# Patient Record
Sex: FEMALE | Race: WHITE | URBAN - METROPOLITAN AREA
[De-identification: names, ages, dates, MRNs, and addresses within clinical notes are randomized per-mention and may not be internally consistent; named-entity substitution may affect disease eponyms.]

---

## 2022-09-09 ENCOUNTER — TELEPHONE (OUTPATIENT)
Dept: OBGYN CLINIC | Facility: CLINIC | Age: 57
End: 2022-09-09

## 2023-03-02 NOTE — TELEPHONE ENCOUNTER
Patient (name and  verified) is calling for results from HPV test. Explained to patient that the provider is waiting for 2 more results and then will call the patient with recommendations. Verbalized understanding.

## 2023-03-09 NOTE — TELEPHONE ENCOUNTER
Daughter  (Konrad Mackey) requesting RX for pt:    Vitamin D     [facility] [address] [facility] [address], [phone], [phone]

## 2023-03-09 NOTE — TELEPHONE ENCOUNTER
Patient's daughter, Kareem Robert (name and , ANNEL verified) instructed on provider message below regarding OTC Vitamin D3 and dose/directions. Verbalized understanding. Priscila Flowers, JESSICA   2023 11:33 AM CST Back to Top      Spoke with patient/daughter, lipid elevation, reduce saturated fats, diet education provided, increase sources of Vit D3/supplement 2000 units Vit D3 once daily with food.

## 2023-03-13 PROBLEM — B97.7 HIGH RISK HPV INFECTION: Status: ACTIVE | Noted: 2023-03-13

## 2023-05-05 NOTE — TELEPHONE ENCOUNTER
Try to call patient but couldn't reach her, left a VM to make a follow up appt with gynecology for her abnormal HPV results.

## 2023-09-19 ENCOUNTER — HOSPITAL ENCOUNTER (OUTPATIENT)
Dept: MAMMOGRAPHY | Age: 58
Discharge: HOME | End: 2023-09-19
Attending: NURSE PRACTITIONER
Payer: MEDICAID

## 2023-09-19 DIAGNOSIS — Z12.31 ENCOUNTER FOR SCREENING MAMMOGRAM FOR MALIGNANT NEOPLASM OF BREAST: ICD-10-CM

## 2023-09-19 PROCEDURE — 77067 SCR MAMMO BI INCL CAD: CPT | Performed by: NURSE PRACTITIONER

## 2023-09-19 PROCEDURE — 77063 BREAST TOMOSYNTHESIS BI: CPT | Performed by: NURSE PRACTITIONER

## 2023-10-13 ENCOUNTER — TELEPHONE (OUTPATIENT)
Dept: INTERNAL MEDICINE CLINIC | Facility: CLINIC | Age: 58
End: 2023-10-13

## 2023-10-13 NOTE — TELEPHONE ENCOUNTER
Maggi, would like to inform the doctor that they phoned the patient to schedule an appointment for additional diagnostic view of the left breast. Maggi, states that the patient did not want to schedule an appointment. Maggi, is not sure if the office staff would like to reach out to the patient and speak with her about this.

## 2023-10-14 NOTE — TELEPHONE ENCOUNTER
[employer] [employer] Line: Dayne ID # [id number]. Attempted to call the patient, unable to leave message, mailbox stating \"nothing has been recorded\" and asked to re-record the message repeatedly.     RN to follow up.

## 2023-10-17 NOTE — TELEPHONE ENCOUNTER
Please send a certified letter regarding need to follow up on results/abnormal results. Thank you!

## (undated) NOTE — LETTER
10/16/2023              Louisa Tolliver        [address] [place] [address]         Dear Louisa,    This letter is to inform you that our office has made several attempts to reach you by phone without success.  We were attempting to contact you by phone regarding radiology report    Please contact our office at the number listed below as soon as you receive this letter to discuss this issue and to make the necessary changes in our system to your contact information.  Thank you for your cooperation.        Sincerely,    Jovanna Valenzuela, JESSICA  No primary provider on file.   [phone]        Document electronically generated by:  Dee Dee CUMMINS RN